# Patient Record
Sex: MALE | Race: OTHER
[De-identification: names, ages, dates, MRNs, and addresses within clinical notes are randomized per-mention and may not be internally consistent; named-entity substitution may affect disease eponyms.]

---

## 2019-04-19 ENCOUNTER — HOSPITAL ENCOUNTER (EMERGENCY)
Dept: HOSPITAL 72 - EMR | Age: 34
Discharge: HOME | End: 2019-04-19
Payer: MEDICAID

## 2019-04-19 VITALS — SYSTOLIC BLOOD PRESSURE: 158 MMHG | DIASTOLIC BLOOD PRESSURE: 82 MMHG

## 2019-04-19 VITALS — WEIGHT: 200 LBS | HEIGHT: 70 IN | BODY MASS INDEX: 28.63 KG/M2

## 2019-04-19 DIAGNOSIS — S01.81XA: Primary | ICD-10-CM

## 2019-04-19 DIAGNOSIS — F10.129: ICD-10-CM
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
## 2019-04-19 DIAGNOSIS — Z23: ICD-10-CM

## 2019-04-19 PROCEDURE — 99284 EMERGENCY DEPT VISIT MOD MDM: CPT

## 2019-04-19 PROCEDURE — 90715 TDAP VACCINE 7 YRS/> IM: CPT

## 2019-04-19 PROCEDURE — 12011 RPR F/E/E/N/L/M 2.5 CM/<: CPT

## 2019-04-19 PROCEDURE — 90471 IMMUNIZATION ADMIN: CPT

## 2019-04-19 PROCEDURE — 70450 CT HEAD/BRAIN W/O DYE: CPT

## 2019-04-19 RX ADMIN — CLOSTRIDIUM TETANI TOXOID ANTIGEN (FORMALDEHYDE INACTIVATED), CORYNEBACTERIUM DIPHTHERIAE TOXOID ANTIGEN (FORMALDEHYDE INACTIVATED), BORDETELLA PERTUSSIS TOXOID ANTIGEN (GLUTARALDEHYDE INACTIVATED), BORDETELLA PERTUSSIS FILAMENTOUS HEMAGGLUTININ ANTIGEN (FORMALDEHYDE INACTIVATED), BORDETELLA PERTUSSIS PERTACTIN ANTIGEN, AND BORDETELLA PERTUSSIS FIMBRIAE 2/3 ANTIGEN ONE ML: 5; 2; 2.5; 5; 3; 5 INJECTION, SUSPENSION INTRAMUSCULAR at 00:31
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
## 2019-04-19 NOTE — DIAGNOSTIC IMAGING REPORT
Indication: Reason For Exam: TRAUMA, pain 

 

Technique: Continuous helical CT scanning of the head was performed without

intravenous contrast material. Axial and coronal 5 mm sections were generated.

Radiation dose was minimized using automated exposure control

 

Dose:

Total Dose Length Product - DLP 1358.39 mGycm.

Volume CT Dose Index - CTDIvol(s) 70.38 mGy.

 

Comparison: None

 

FINDINGS: 

There is no acute intracranial hemorrhage, mass effect or cortical edema. Is no shift

of midline structures. The ventricles, cisterns and sulci are normal for age. Mastoid

air cells are clear. There is mild paranasal sinus disease with a partially

visualized polyp versus retention cyst in the left maxillary sinus. Mild frontal

scalp soft tissue swelling. No associated acute calvarial fracture.

 

IMPRESSION: 

No evidence of acute intracranial hemorrhage, mass effect or cortical edema. 

 

Mild frontal scalp soft tissue swelling. No acute calvarial fracture.

 

Mild paranasal sinus disease with partially visualized retention cyst versus polyp in

the left maxillary sinus.

 

Salient findings correspond with the statrad preliminary report.

 

The CT scanner at [facility] is accredited by the American College of

Radiology and the scans are performed using protocols designed to limit radiation

exposure to as low as reasonably achievable to attain images of sufficient resolution

adequate for diagnostic evaluation.

## 2019-04-19 NOTE — NUR
ER Nurse Note:



Pt came back from CT; ERMD at pt side, using dermabond for the laceration. Pt 
tolerated well. All orders completed per ERMD orders. Bed in lowest position, 
will continue to [facility].
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
## 2019-04-19 NOTE — NUR
ER Nurse Note:



Pt seen, treated, medicially cleared for discharge by EUSEBIO. Discharge 
instructions given with repeat verbalization by pt. Instructed pt follow up 
with primary care physican within one week. Pt a&ox4, VSS, no signs of 
distress. Pt left with steady gait. ID band removed. Pt left with own 
transportation with all belongings.

-------------------------------------------------------------------------------

Addendum: 04/19/19 at 0225 by [email or web address]

-------------------------------------------------------------------------------

ER Nurse Note:



Pt cleaned and given a new shirt. Pt walked with steady gait, left with family 
member.

## 2020-05-14 ENCOUNTER — HOSPITAL ENCOUNTER (EMERGENCY)
Dept: HOSPITAL 72 - EMR | Age: 35
Discharge: HOME | End: 2020-05-14
Payer: MEDICAID

## 2020-05-14 VITALS — DIASTOLIC BLOOD PRESSURE: 74 MMHG | SYSTOLIC BLOOD PRESSURE: 128 MMHG

## 2020-05-14 VITALS — BODY MASS INDEX: 36.8 KG/M2 | WEIGHT: 229 LBS | HEIGHT: 66 IN

## 2020-05-14 VITALS — DIASTOLIC BLOOD PRESSURE: 77 MMHG | SYSTOLIC BLOOD PRESSURE: 123 MMHG
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
## 2020-05-14 DIAGNOSIS — S51.811A: Primary | ICD-10-CM
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
## 2020-05-14 PROCEDURE — 99283 EMERGENCY DEPT VISIT LOW MDM: CPT

## 2020-05-14 PROCEDURE — 12001 RPR S/N/AX/GEN/TRNK 2.5CM/<: CPT

## 2020-05-14 PROCEDURE — 90715 TDAP VACCINE 7 YRS/> IM: CPT

## 2020-05-14 PROCEDURE — 90471 IMMUNIZATION ADMIN: CPT

## 2020-05-14 NOTE — EMERGENCY ROOM REPORT
History of Present Illness


General


Chief Complaint:  Laceration


Source:  Patient





Present Illness


HPI


36 YO male presents to the ED c/o 2/10 in severity painful laceration to the 

right forearm. Pt. reports he was cut last night at approx. 12pm by a piece of 

metal that was sticking out of some drywall. He denies fall or notable trauma. 

He denies suspicion of foreign body. Pt. does not know when his last tetanus 

vaccination was. Pt. is right hand dominant. He denies taking blood thinning 

medications. Palpation exacerbates his pain. No pain with extremity movements. 

Denies paresthesias or loss of gross motor movements of the right extremity.


Allergies:  


Coded Allergies:  


     No Known Allergies (Unverified , 4/19/19)





COVID-19 Screening


Contact w/high risk pt:  No


Recent Travel to affected area:  No


Experienced COVID-19 symptoms?:  No


COVID-19 Testing performed PTA:  No





Patient History


Past Medical History:  see triage record


Past Surgical History:  none


Pertinent Family History:  none


Reviewed Nursing Documentation:  PMH: Agreed; PSxH: Agreed





Nursing Documentation-PMH


Past Medical History:  No Stated History





Review of Systems


All Other Systems:  negative except mentioned in HPI





Physical Exam





Vital Signs








  Date Time  Temp Pulse Resp B/P (MAP) Pulse Ox O2 Delivery O2 Flow Rate FiO2


 


5/14/20 13:52 98.2 82 16 123/77 97 Room Air  








Sp02 EP Interpretation:  reviewed, normal


General Appearance:  no apparent distress, alert, GCS 15, non-toxic


Head:  normocephalic, atraumatic


Eyes:  bilateral eye normal inspection, bilateral eye PERRL


ENT:  hearing grossly normal, normal voice


Neck:  full range of motion


Respiratory:  chest non-tender, lungs clear, normal breath sounds, speaking 

full sentences


Cardiovascular #1:  regular rate, rhythm, normal capillary refill


Cardiovascular #2:  2+ radial (R)


Musculoskeletal:  normal range of motion, gait/station normal, non-tender


Neurologic:  alert, motor strength/tone normal, distal neuro normal, oriented x3

, sensory intact, responsive, speech normal, grossly normal


Psychiatric:  judgement/insight normal


Skin:  laceration - Right Forearm  laceration approx  2 cm in length





Procedures


Laceration/Wound Repair


Laceration/Wound Repair :  


   Consent:  Verbal


   Wound Location:  upper extremity - Right Forearm


   Wound's Depth, Shape:  irregular


   Wound Length (cm):  2


   Wound Explored:  clean


   Irrigated w/ Saline (ccs):  500


   Anesthesia:  Lidocaine w/ Epi


   Volume Anesthetic (ccs):  3


   Wound Repaired With:  sutures


   Suture Size/Type:  4:0


   Number of Sutures:  5


   Layer Closure?:  No


   Sterile Dressing Applied?:  Yes


   Splint Applied?:  No


   Sling Applied?:  No


   Patient Tolerated:  Well


   Complications:  None





Medical Decision Making


PA Attestation


Dr. Callaway Is my supervising Physician whom patient management has been 

discussed with.


Diagnostic Impression:  


 Primary Impression:  


 Laceration


ER Course


36 YO male presents to the ED c/o 2/10 in severity painful laceration to the 

right forearm. Pt. reports he was cut last night at approx. 12pm by a piece of 

metal that was sticking out of some drywall. He denies fall or notable trauma. 

He denies suspicion of foreign body. Pt. does not know when his last tetanus 

vaccination was. Pt. is right hand dominant. He denies taking blood thinning 

medications. Palpation exacerbates his pain. No pain with extremity movements. 

Denies paresthesias or loss of gross motor movements of the right extremity. 


Ddx considered but are not limited to laceration, tendon injury, cellulitis, 

amputation





Vital signs: are WNL, pt. is afebrile


H&PE are most consistent with:  Right Forearm  laceration approx  2 cm in length





ORDERS: none required at this time, the diagnosis is clinical





ED INTERVENTIONS:    ** D/w pt. that lacerations closed with over 12 hours 

delay such as his are at increased risk of infection. Pt. made the decision to 

have sutures placed as he wanted the best cosmetic outcome possible. d/w pt. we 

will place sutures in a sterile fashion and place him on oral and topical abx. 

to decrease risk of infection. 





-Tetanus vaccine was administered as pt. vaccination status was  unknown.


- The wound was copiously irrigated with normal saline, and explored for 

foreign body for which no FB  was found. 


- pt. is anesthetized with 1%lidocaine w. epi.


 - The wound was approximated and closed using 5 interrupted 4.0 Ethilon 

sutures.


-Bacitracin and sterile dressing is applied.





Discussed with patient:  That we make every effort to approximate the 

laceration as best as we can so that scarring will be as cosmetically pleasing 

as possible with our limited cosmetic skill set in the Emergency dept.  

Regardless of our best efforts there will be scarring after laceration repair.  

The extent of scarring is unknown at this time.  





DISCHARGE: At this time pt. is stable for d/c to home. Will provide printed 

patient care instructions, and any necessary prescriptions. Care plan and 

follow up instructions have been discussed with the patient prior to discharge.





Last Vital Signs








  Date Time  Temp Pulse Resp B/P (MAP) Pulse Ox O2 Delivery O2 Flow Rate FiO2


 


5/14/20 13:52 98.2 81 16 123/77 (92) 97 Room Air  








Disposition:  HOME, SELF-CARE


Condition:  Stable


Scripts


No Active Prescriptions or Reported Meds


Referrals:  


[facility]





[facility]





[facility]


Patient Instructions:  Laceration Care, Adult





Additional Instructions:  


Take medications as directed.  ** SUTURES TO BE REMOVED IN 10 DAYS **





 ** Follow up with a Primary Care Provider in 3-5 days, even if your symptoms 

have resolved. ** 


--Please review list of primary care clinics, if you do not already have a 

primary care provider





Return sooner to ED if new symptoms occur, or current symptoms become worse. 











- Please note that this Emergency Department Report was dictated using [employer] technology software, occasionally this can lead to 

erroneous entry secondary to interpretation by the dictation equipment.











Maureen Metzger May 14, 2020 14:50

## 2020-10-11 ENCOUNTER — HOSPITAL ENCOUNTER (EMERGENCY)
Dept: HOSPITAL 72 - EMR | Age: 35
Discharge: HOME | End: 2020-10-11
Payer: MEDICAID

## 2020-10-11 VITALS — DIASTOLIC BLOOD PRESSURE: 83 MMHG | SYSTOLIC BLOOD PRESSURE: 136 MMHG

## 2020-10-11 VITALS — WEIGHT: 185 LBS | BODY MASS INDEX: 30.82 KG/M2 | HEIGHT: 65 IN
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
## 2020-10-11 DIAGNOSIS — M54.5: ICD-10-CM

## 2020-10-11 DIAGNOSIS — S39.012A: Primary | ICD-10-CM

## 2020-10-11 PROCEDURE — 96372 THER/PROPH/DIAG INJ SC/IM: CPT

## 2020-10-11 PROCEDURE — 99283 EMERGENCY DEPT VISIT LOW MDM: CPT

## 2020-10-11 NOTE — EMERGENCY ROOM REPORT
History of Present Illness


General


Chief Complaint:  Lower Back Pain or Injury


Source:  Patient





Present Illness


HPI


35-year-old male presents to the emergency department complaining of 8 out of 10

severity diffuse low back pain that is been progressive since yesterday.  

Patient reports that he was moving long heavy pieces a slab yesterday at work 

and he began having some low back pain at the end of the day.  Patient reports 

his pain progressed overnight and is now 8 out of 10 severity since this 

morning.  Patient denies appreciable trauma or fall.  He denies incontinence, 

saddle anesthesia, recent spinal procedures, fevers, chills, weakness or 

inability to ambulate.  Patient reports his pain is exacerbated upon forward 

flexion.  Patient states he has not taken any medication today for symptoms.  No

other aggravating or relieving factors.


Allergies:  


Coded Allergies:  


     No Known Allergies (Unverified , 4/19/19)





COVID-19 Screening


Contact w/high risk pt:  No


Recent Travel to affected area:  No


Experienced COVID-19 symptoms?:  No


COVID-19 Testing performed PTA:  No





Patient History


Past Medical History:  see triage record


Past Surgical History:  none


Pertinent Family History:  none


Reviewed Nursing Documentation:  PMH: Agreed; PSxH: Agreed





Nursing Documentation-PMH


Past Medical History:  No Stated History





Review of Systems


All Other Systems:  negative except mentioned in HPI





Physical Exam





Vital Signs








  Date Time  Temp Pulse Resp B/P (MAP) Pulse Ox O2 Delivery O2 Flow Rate FiO2


 


10/11/20 13:25 98.2 68 19 136/83 (100) 95 Room Air  








Sp02 EP Interpretation:  reviewed, normal


General Appearance:  no apparent distress, alert, GCS 15, non-toxic


Head:  normocephalic, atraumatic


Eyes:  bilateral eye normal inspection, bilateral eye PERRL


ENT:  hearing grossly normal, normal voice


Neck:  full range of motion


Respiratory:  chest non-tender, lungs clear, normal breath sounds, speaking full

sentences


Cardiovascular #1:  regular rate, rhythm


Gastrointestinal:  non tender, soft


Genitourinary:  normal inspection, no CVA tenderness


Musculoskeletal:  back normal, normal range of motion, gait/station normal, 

tender - diffuse lumbar paraspinal and midline ttp. FROM, no palpable step-off 

or obvious deformity. No localized spinous process ttp.


Neurologic:  alert, motor strength/tone normal, oriented x3, sensory intact, 

responsive, speech normal, normal gait


Psychiatric:  judgement/insight normal


Skin:  no rash, normal color





Medical Decision Making


PA Attestation


Dr. Callaway Is my supervising Physician whom patient management has been 

discussed with.


Diagnostic Impression:  


   Primary Impression:  


   Low back pain


   Qualified Codes:  M54.5 - Low back pain


   Additional Impression:  


   Lumbosacral strain


   Qualified Codes:  S39.012A - Strain of muscle, fascia and tendon of lower 

   back, initial encounter


ER Course


35-year-old male presents to the emergency department complaining of 8 out of 10

severity diffuse low back pain that is been progressive since yesterday.  

Patient reports that he was moving long heavy pieces a slab yesterday at work 

and he began having some low back pain at the end of the day.  Patient reports 

his pain progressed overnight and is now 8 out of 10 severity since this 

morning.  Patient denies appreciable trauma or fall.  He denies incontinence, 

saddle anesthesia, recent spinal procedures, fevers, chills, weakness or 

inability to ambulate.  Patient reports his pain is exacerbated upon forward 

flexion.  Patient states he has not taken any medication today for symptoms.  No

other aggravating or relieving factors.





Ddx considered: epidural abscess, fracture, sprain/strain, meningitis, spinal 

chord injury, sciatica, cauda equina, Pyelonephritis, renal calculi just to name

a few.  





Vital signs reviewed and are WNL during ED visit.





Pt. is afebrile with no signs of infection


No new symptoms, and denies recent trauma.


No saddle anesthesia noted, Pt. denies incontinence.  Patient is able to squeeze

his butt cheeks


Neurovascular is intact


ROM is limited due to pain





* Mild  Tenderness to palpation to paraspinal muscles of the lower back with 

some diffuse midline tenderness. 


*Pt. describes pain today as moderate and radiates across the lower back. 





ORDERS: none warranted at this time.  





INTERVENTIONS: 


- 30mg IM Toradol


-Loading dose of Robaxin 1g PO





-I do not identify an emergent condition at this time. With current 

presentation,  pt. is stable for close outpatient follow up and conservative 

treatment.  D/w pt. to return promptly to ED with worsening or new symptoms.- 

Pt. verbalizes' understanding and agreement with proposed treatment plan.** 





DISCHARGE: At this time pt. is stable for d/c to home. Will provide printed 

patient care instructions, and any necessary prescriptions. Care plan and follow

up instructions have been discussed with the patient prior to discharge.





Last Vital Signs








  Date Time  Temp Pulse Resp B/P (MAP) Pulse Ox O2 Delivery O2 Flow Rate FiO2


 


10/11/20 13:25 98.2 68 19 136/83 (100) 95 Room Air  








Status:  improved


Disposition:  HOME, SELF-CARE


Condition:  Stable


Scripts


Ibuprofen* (MOTRIN*) 600 Mg Tablet


600 MG ORAL THREE TIMES A DAY, #20 TAB


   Prov: Maureen Metzger         10/11/20 


Lidocaine Patch* (Lidoderm Patch*) 1 Each Adh..patch


1 PATCH TOPIC DAILY, #30 PATCH 0 Refills


   Patch(es) may remain in place for up to 12 hours in any 24-hour


   period.


   Prov: Maureen Metzger         10/11/20 


Methocarbamol* (ROBAXIN-750*) 750 Mg Tablet


750 MG PO QID, #28 TAB 0 Refills


   Prov: Maureen Metzger         10/11/20


Referrals:  


[facility]





[facility]


Departure Forms:  Return to Work      Return to Work Date:  Oct 15, 2020


   Other Restrictions:  light duty.  May return Sooner if Symptoms have 

resolved. 


   Return to Full Activity:  Oct 20, 2020


   Work Restrictions:  No Heavy Lifting


Patient Instructions:  Lumbosacral Strain





Additional Instructions:  


Take medications as directed. 





 ** Follow up with a Primary Care Provider in 3-5 days, even if your symptoms 

have resolved. ** 





--Please review list of primary care clinics, if you do not already have a 

primary care provider





Return sooner to ED if new symptoms occur, or current symptoms become worse. 





Do not drink alcohol, drive, or operate heavy machinery while taking Robaxin ( 

Muscle Relaxers) as this may cause drowsiness. 











- Please note that this Emergency Department Report was dictated using "[employer] technology software, occasionally this can lead to 

erroneous entry secondary to interpretation by the dictation equipment.











Maureen Metzger              Oct 11, 2020 14:11

## 2020-10-11 NOTE — NUR
ED Nurse Note:pain meds were given



Pt cleared by health care Provider for discharge.  DC instructions/prescription 
was given and explained to pt and verbalized understanding of teachings. All 
medical deviecs such as ID band  removed. Pt is AAO x4, ambulatory and left 
with all personal belongings.

## 2020-12-17 ENCOUNTER — HOSPITAL ENCOUNTER (EMERGENCY)
Dept: HOSPITAL 72 - EMR | Age: 35
Discharge: HOME | End: 2020-12-17
Payer: MEDICAID

## 2020-12-17 VITALS — BODY MASS INDEX: 35.31 KG/M2 | HEIGHT: 67 IN | WEIGHT: 225 LBS

## 2020-12-17 VITALS — SYSTOLIC BLOOD PRESSURE: 135 MMHG | DIASTOLIC BLOOD PRESSURE: 80 MMHG

## 2020-12-17 VITALS — DIASTOLIC BLOOD PRESSURE: 80 MMHG | SYSTOLIC BLOOD PRESSURE: 154 MMHG

## 2020-12-17 DIAGNOSIS — R19.7: ICD-10-CM

## 2020-12-17 DIAGNOSIS — U07.1: Primary | ICD-10-CM

## 2020-12-17 PROCEDURE — 99282 EMERGENCY DEPT VISIT SF MDM: CPT

## 2020-12-17 NOTE — NUR
ED Nurse Note: Pt walked in from home, walks with a steady gait and his vitals 
signs are stable as documetned on RA. He states that he was tested for covid 
and was positive for COVID on 12/14. He states that today he has been feeling 
short of breath. His SPO2 is 100% on RA. His breathing is even and unlabored.

## 2020-12-17 NOTE — EMERGENCY ROOM REPORT
History of Present Illness


General


Chief Complaint:  Dyspnea/Respdistress


Source:  Patient





Present Illness


HPI


The patient has been ill for 4 days.  He tested positive for Covid recently.  He

has had diarrhea.  The diarrhea has been yellow with no blood.  He denies any 

abdominal pain.  The diarrhea is somewhat improving.  He denies any upper 

respiratory symptoms at this time.  This includes shortness of breath, cough and

chest pain.  He denies sore throat.  He has felt achy and feverish but did not 

document a temperature.  His wife is also present with upper respiratory 

symptoms consistent with Covid.





No sore throat, chest pain, palpitations, nausea, vomiting, dysuria, joint pain,

rashes, depression, anxiety, visual changes, dizziness, headache.


Allergies:  


Coded Allergies:  


     No Known Allergies (Unverified , 4/19/19)





COVID-19 Screening


Contact w/high risk pt:  Yes


Recent Travel to affected area:  No


Experienced COVID-19 symptoms?:  Yes


COVID-19 Testing performed PTA:  Yes


COVID-19 Screening:  Positive COVID-19


COVID-19 Testing Source:  nasal





Patient History


Past Medical History:  none, see triage record


Social History:  Reports: smoking


Social History Narrative


, works construction


Reviewed Nursing Documentation:  PMH: Agreed; PSxH: Agreed





Nursing Documentation-PMH


Past Medical History:  No Stated History





Review of Systems


All Other Systems:  negative except mentioned in HPI





Physical Exam





Vital Signs








  Date Time  Temp Pulse Resp B/P (MAP) Pulse Ox O2 Delivery O2 Flow Rate FiO2


 


12/17/20 11:41  88 20   Room Air  


 


12/17/20 11:41 98.7   154/80 100   








Sp02 EP Interpretation:  reviewed, normal


General Appearance:  well appearing, no apparent distress, GCS 15, non-toxic


Head:  normocephalic


Eyes:  bilateral eye normal inspection, bilateral eye PERRL, bilateral eye EOMI


ENT:  normal pharynx, moist mucus membranes


Neck:  full range of motion, supple


Respiratory:  lungs clear, normal breath sounds


Cardiovascular #1:  regular rate, rhythm


Cardiovascular #2:  2+ radial (R)


Gastrointestinal:  normal inspection, normal bowel sounds, non tender, no mass, 

non-distended


Genitourinary:  no CVA tenderness


Musculoskeletal:  back normal, normal range of motion, gait/station normal


Neurologic:  alert, oriented x3, grossly normal


Psychiatric:  mood/affect normal


Skin:  no rash, warm/dry





Medical Decision Making


Diagnostic Impression:  


   Primary Impression:  


   Diarrhea due to COVID-19


ER Course


Patient presents with complaint of diarrhea after testing positive for COVID-19.

 Differential includes COVID-19 diarrhea, food poisoning, gastroenteritis 

amongst others.  Based on the clinical presentation COVID-19 is the likely 

diagnosis.  There are no respiratory symptoms at this time.  Loperamide is 

given.  Laboratory evaluation is not indicated.





Discussed treatment plan with patient.





This patient was evaluated in the context of the global COVID-19 pandemic, which

necessitated consideration that the patient might be at risk for infection with 

the SARS-COVID-2 virus that causes COVID-19.  Institutional protocols and 

algorithms that pertain to the evaluation of patients at risk for COVID-19 and 

the state of rapid change based on information released by multiple regulatory 

bodies including the CDC and federal and state organizations.  These policies 

and algorithms were followed during the patient's care in the ED.





Patient is stable for outpatient observation and treatment.





Last Vital Signs








  Date Time  Temp Pulse Resp B/P (MAP) Pulse Ox O2 Delivery O2 Flow Rate FiO2


 


12/17/20 15:30 98.6 88 18 135/80 100 Room Air  








Status:  improved


Disposition:  HOME, SELF-CARE


Condition:  Stable


Scripts


Loperamide Hcl (LOPERAMIDE) 2 Mg Capsule


2 MG PO BID, #6 CAP 1 Refill


   Prov: Kam Bah MD         12/17/20


Referrals:  


NOT CHOSEN [facility]/MD,REFERRING (PCP)











Kam Bah MD                Dec 17, 2020 13:04